# Patient Record
Sex: FEMALE | Race: WHITE | URBAN - NONMETROPOLITAN AREA
[De-identification: names, ages, dates, MRNs, and addresses within clinical notes are randomized per-mention and may not be internally consistent; named-entity substitution may affect disease eponyms.]

---

## 2019-04-11 ENCOUNTER — IMPORTED ENCOUNTER (OUTPATIENT)
Dept: URBAN - NONMETROPOLITAN AREA CLINIC 1 | Facility: CLINIC | Age: 39
End: 2019-04-11

## 2019-04-11 PROBLEM — H52.13: Noted: 2019-04-11

## 2019-04-11 PROCEDURE — S0620 ROUTINE OPHTHALMOLOGICAL EXA: HCPCS

## 2019-04-11 NOTE — PATIENT DISCUSSION
Simple Myopia OU-  discussed findings w/patient-  optional spectacle Rx issued-  continue to monitor yearly or prn; 's Notes: MR 4/11/2019DFE 4/11/2019

## 2022-04-10 ASSESSMENT — TONOMETRY
OS_IOP_MMHG: 15
OD_IOP_MMHG: 15

## 2022-04-10 ASSESSMENT — VISUAL ACUITY
OU_SC: J1+
OD_CC: 20/20
OS_CC: 20/25+1
OU_CC: 20/20